# Patient Record
Sex: FEMALE | Race: WHITE | URBAN - NONMETROPOLITAN AREA
[De-identification: names, ages, dates, MRNs, and addresses within clinical notes are randomized per-mention and may not be internally consistent; named-entity substitution may affect disease eponyms.]

---

## 2021-04-01 ENCOUNTER — IMPORTED ENCOUNTER (OUTPATIENT)
Dept: URBAN - NONMETROPOLITAN AREA CLINIC 1 | Facility: CLINIC | Age: 46
End: 2021-04-01

## 2021-04-01 PROBLEM — H52.221: Noted: 2021-04-01

## 2021-04-01 PROBLEM — H52.03: Noted: 2021-04-01

## 2021-04-01 PROBLEM — H52.4: Noted: 2021-04-01

## 2021-04-01 PROCEDURE — S0620 ROUTINE OPHTHALMOLOGICAL EXA: HCPCS

## 2021-04-01 NOTE — PATIENT DISCUSSION
Compound Hyperopic Astigmatism OD/Simple Hyperopia OS-  discussed findings w/patient-  new spectacle Rx issued-  ok for patient to use OTC-  continue to monitor yearly or prn; 's Notes: MR 4/1/2021DFE 4/1/2021

## 2022-04-09 ASSESSMENT — KERATOMETRY
OD_K1POWER_DIOPTERS: 46.75
OS_AXISANGLE_DEGREES: 167
OS_K2POWER_DIOPTERS: 46.75
OD_AXISANGLE_DEGREES: 173
OS_K1POWER_DIOPTERS: 46.50
OD_K2POWER_DIOPTERS: 46.50

## 2022-04-09 ASSESSMENT — VISUAL ACUITY
OU_CC: 20/20
OS_CC: 20/20
OU_SC: 20/20
OD_CC: 20/20

## 2022-04-09 ASSESSMENT — TONOMETRY
OD_IOP_MMHG: 17
OS_IOP_MMHG: 18